# Patient Record
Sex: FEMALE | URBAN - NONMETROPOLITAN AREA
[De-identification: names, ages, dates, MRNs, and addresses within clinical notes are randomized per-mention and may not be internally consistent; named-entity substitution may affect disease eponyms.]

---

## 2018-05-30 ENCOUNTER — OFFICE VISIT (OUTPATIENT)
Dept: FAMILY MEDICINE CLINIC | Age: 48
End: 2018-05-30
Payer: MEDICARE

## 2018-05-30 VITALS — DIASTOLIC BLOOD PRESSURE: 86 MMHG | WEIGHT: 115 LBS | HEART RATE: 80 BPM | SYSTOLIC BLOOD PRESSURE: 130 MMHG

## 2018-05-30 DIAGNOSIS — Z72.0 TOBACCO USE: ICD-10-CM

## 2018-05-30 DIAGNOSIS — G44.201 ACUTE INTRACTABLE TENSION-TYPE HEADACHE: Primary | ICD-10-CM

## 2018-05-30 DIAGNOSIS — M54.2 NECK PAIN: ICD-10-CM

## 2018-05-30 PROCEDURE — 4004F PT TOBACCO SCREEN RCVD TLK: CPT | Performed by: FAMILY MEDICINE

## 2018-05-30 PROCEDURE — G8427 DOCREV CUR MEDS BY ELIG CLIN: HCPCS | Performed by: FAMILY MEDICINE

## 2018-05-30 PROCEDURE — G8421 BMI NOT CALCULATED: HCPCS | Performed by: FAMILY MEDICINE

## 2018-05-30 PROCEDURE — 99203 OFFICE O/P NEW LOW 30 MIN: CPT | Performed by: FAMILY MEDICINE

## 2018-05-30 RX ORDER — MELOXICAM 15 MG/1
15 TABLET ORAL DAILY
Qty: 30 TABLET | Refills: 1 | Status: SHIPPED | OUTPATIENT
Start: 2018-05-30 | End: 2018-08-16 | Stop reason: SDUPTHER

## 2018-05-30 ASSESSMENT — PATIENT HEALTH QUESTIONNAIRE - PHQ9
SUM OF ALL RESPONSES TO PHQ QUESTIONS 1-9: 1
2. FEELING DOWN, DEPRESSED OR HOPELESS: 1
SUM OF ALL RESPONSES TO PHQ9 QUESTIONS 1 & 2: 1

## 2018-08-16 ENCOUNTER — OFFICE VISIT (OUTPATIENT)
Dept: FAMILY MEDICINE CLINIC | Age: 48
End: 2018-08-16
Payer: MEDICARE

## 2018-08-16 VITALS
SYSTOLIC BLOOD PRESSURE: 120 MMHG | HEIGHT: 65 IN | HEART RATE: 80 BPM | BODY MASS INDEX: 18.66 KG/M2 | DIASTOLIC BLOOD PRESSURE: 64 MMHG | WEIGHT: 112 LBS

## 2018-08-16 DIAGNOSIS — S06.0X0S CONCUSSION WITHOUT LOSS OF CONSCIOUSNESS, SEQUELA (HCC): ICD-10-CM

## 2018-08-16 DIAGNOSIS — G44.329 CHRONIC POST-TRAUMATIC HEADACHE, NOT INTRACTABLE: Primary | ICD-10-CM

## 2018-08-16 DIAGNOSIS — Z72.0 TOBACCO USE: ICD-10-CM

## 2018-08-16 PROCEDURE — G8420 CALC BMI NORM PARAMETERS: HCPCS | Performed by: FAMILY MEDICINE

## 2018-08-16 PROCEDURE — 4004F PT TOBACCO SCREEN RCVD TLK: CPT | Performed by: FAMILY MEDICINE

## 2018-08-16 PROCEDURE — G8427 DOCREV CUR MEDS BY ELIG CLIN: HCPCS | Performed by: FAMILY MEDICINE

## 2018-08-16 PROCEDURE — 99214 OFFICE O/P EST MOD 30 MIN: CPT | Performed by: FAMILY MEDICINE

## 2018-08-16 RX ORDER — MELOXICAM 15 MG/1
15 TABLET ORAL DAILY
Qty: 30 TABLET | Refills: 1 | Status: SHIPPED | OUTPATIENT
Start: 2018-08-16 | End: 2019-04-10 | Stop reason: ALTCHOICE

## 2018-08-16 ASSESSMENT — PATIENT HEALTH QUESTIONNAIRE - PHQ9
SUM OF ALL RESPONSES TO PHQ QUESTIONS 1-9: 2
SUM OF ALL RESPONSES TO PHQ QUESTIONS 1-9: 2
2. FEELING DOWN, DEPRESSED OR HOPELESS: 2
1. LITTLE INTEREST OR PLEASURE IN DOING THINGS: 0
SUM OF ALL RESPONSES TO PHQ9 QUESTIONS 1 & 2: 2

## 2018-08-16 NOTE — PROGRESS NOTES
life.        Objective:     /64   Pulse 80   Ht 5' 5\" (1.651 m)   Wt 112 lb (50.8 kg)   BMI 18.64 kg/m²     Physical Exam   Constitutional: She is oriented to person, place, and time. She appears well-developed and well-nourished. She appears cachectic. Neck: Neck supple. Cardiovascular: Normal rate. No murmur heard. Pulmonary/Chest: Effort normal and breath sounds normal. No respiratory distress. She has no wheezes. Musculoskeletal: She exhibits tenderness (upper neck bilaterally to occiput, no spinous tenderness noted. ). She exhibits no edema. Lymphadenopathy:     She has no cervical adenopathy. Neurological: She is alert and oriented to person, place, and time. Assessment:      Diagnosis Orders   1. Chronic post-traumatic headache, not intractable  Amb External Referral To Physical Therapy    External Referral To Neurosurgery   2. Concussion without loss of consciousness, sequela [facility])  External Referral To Neurosurgery   3. Tobacco use       No results found for this visit on 08/16/18. Plan:     No Follow-up on file. There are no Patient Instructions on file for this visit. Resume Meloxicam with PT and plan for evaluation with neurology to ensure improving in future.    Orders Placed This Encounter   Procedures    Amb External Referral To Physical Therapy     Referral Priority:   Routine     Referral Type:   Physical Therapy     Referral Reason:   Specialty Services Required     Referred to Provider:   Brittany Petersen [place]     Requested Specialty:   Physical Therapy     Number of Visits Requested:   1    External Referral To Neurosurgery     Referral Priority:   Routine     Referral Type:   Eval and Treat     Referral Reason:   Specialty Services Required     Referred to Provider:   Derrell Guerra     Requested Specialty:   Neurology     Number of Visits Requested:   1     Orders Placed This Encounter   Medications    meloxicam (MOBIC) 15 MG tablet

## 2018-10-15 ENCOUNTER — OFFICE VISIT (OUTPATIENT)
Dept: FAMILY MEDICINE CLINIC | Age: 48
End: 2018-10-15
Payer: MEDICARE

## 2018-10-15 VITALS
WEIGHT: 113 LBS | HEART RATE: 80 BPM | DIASTOLIC BLOOD PRESSURE: 70 MMHG | BODY MASS INDEX: 18.8 KG/M2 | SYSTOLIC BLOOD PRESSURE: 104 MMHG

## 2018-10-15 DIAGNOSIS — Z23 NEED FOR TETANUS BOOSTER: ICD-10-CM
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
## 2018-10-15 PROCEDURE — 4004F PT TOBACCO SCREEN RCVD TLK: CPT | Performed by: FAMILY MEDICINE

## 2018-10-15 PROCEDURE — 99213 OFFICE O/P EST LOW 20 MIN: CPT | Performed by: FAMILY MEDICINE

## 2018-10-15 PROCEDURE — G8484 FLU IMMUNIZE NO ADMIN: HCPCS | Performed by: FAMILY MEDICINE

## 2018-10-15 PROCEDURE — G8427 DOCREV CUR MEDS BY ELIG CLIN: HCPCS | Performed by: FAMILY MEDICINE

## 2018-10-15 PROCEDURE — 90471 IMMUNIZATION ADMIN: CPT | Performed by: FAMILY MEDICINE

## 2018-10-15 PROCEDURE — G8420 CALC BMI NORM PARAMETERS: HCPCS | Performed by: FAMILY MEDICINE

## 2018-10-15 PROCEDURE — 90715 TDAP VACCINE 7 YRS/> IM: CPT | Performed by: FAMILY MEDICINE

## 2018-10-15 RX ORDER — AMOXICILLIN AND CLAVULANATE POTASSIUM 875; 125 MG/1; MG/1
1 TABLET, FILM COATED ORAL 2 TIMES DAILY
Qty: 20 TABLET | Refills: 0 | Status: SHIPPED | OUTPATIENT
Start: 2018-10-15 | End: 2018-10-17 | Stop reason: DRUGHIGH

## 2018-10-15 ASSESSMENT — ENCOUNTER SYMPTOMS: RESPIRATORY NEGATIVE: 1

## 2018-10-15 NOTE — PATIENT INSTRUCTIONS
Patient Education        Animal Bites: Care Instructions  Your Care Instructions  After an animal bite, the biggest concern is infection. The chance of infection depends on the type of animal that bit you, where on your body you were bitten, and your general health. Many animal bites are not closed with stitches, because this can increase the chance of infection. Your bite may take as little as 7 days or as long as several months to heal, depending on how bad it is. Taking good care of your wound at home will help it heal and reduce your chance of infection. The doctor has checked you carefully, but problems can develop later. If you notice any problems or new symptoms, get medical treatment right away. Follow-up care is a key part of your treatment and safety. Be sure to make and go to all appointments, and call your doctor if you are having problems. It's also a good idea to know your test results and keep a list of the medicines you take. How can you care for yourself at home? · If your doctor told you how to care for your wound, follow your doctor's instructions. If you did not get instructions, follow this general advice:  ¨ After 24 to 48 hours, gently wash the wound with clean water 2 times a day. Do not scrub or soak the wound. Don't use hydrogen peroxide or alcohol, which can slow healing. ¨ You may cover the wound with a thin layer of petroleum jelly, such as Vaseline, and a nonstick bandage. ¨ Apply more petroleum jelly and replace the bandage as needed. · After you shower, gently dry the wound with a clean towel. · If your doctor has closed the wound, cover the bandage with a plastic bag before you take a shower. · A small amount of skin redness and swelling around the wound edges and the stitches or staples is normal. Your wound may itch or feel irritated. Do not scratch or rub the wound.   · Ask your doctor if you can take an over-the-counter pain medicine, such as acetaminophen (Tylenol), ibuprofen (Advil, Motrin), or naproxen (Aleve). Read and follow all instructions on the label. · Do not take two or more pain medicines at the same time unless the doctor told you to. Many pain medicines have acetaminophen, which is Tylenol. Too much acetaminophen (Tylenol) can be harmful. · If your bite puts you at risk for rabies, you will get a series of shots over the next few weeks to prevent rabies. Your doctor will tell you when to get the shots. It is very important that you get the full cycle of shots. Follow your doctor's instructions exactly. · You may need a tetanus shot if you have not received one in the last 5 years. · If your doctor prescribed antibiotics, take them as directed. Do not stop taking them just because you feel better. You need to take the full course of antibiotics. When should you call for help? Call your doctor now or seek immediate medical care if:    · The skin near the bite turns cold or pale or it changes color.     · You lose feeling in the area near the bite, or it feels numb or tingly.     · You have trouble moving a limb near the bite.     · You have symptoms of infection, such as:  ¨ Increased pain, swelling, warmth, or redness near the wound. ¨ Red streaks leading from the wound. ¨ Pus draining from the wound. ¨ A fever.     · Blood soaks through the bandage. Oozing small amounts of blood is normal.     · Your pain is getting worse.    Watch closely for changes in your health, and be sure to contact your doctor if you are not getting better as expected. Where can you learn more? Go to [email or web address] and sign in to your [employer] account. Enter G671 in the [employer] box to learn more about \"Animal Bites: Care Instructions. \"     If you do not have an account, please click on the \"Sign Up Now\" link. Current as of: November 20, 2017  Content Version: 11.7  © 9271-3377 [employer].  Care instructions adapted under

## 2018-10-15 NOTE — PROGRESS NOTES
Dispense:  20 tablet     Refill:  0        No Follow-up on file. Electronically signed by Eduardo Londono MD on10/15/2018. **This report has been created using voice recognition software. It may contain minor errors which are inherent in voice recognition technology. **

## 2018-10-17 ENCOUNTER — OFFICE VISIT (OUTPATIENT)
Dept: FAMILY MEDICINE CLINIC | Age: 48
End: 2018-10-17
Payer: MEDICARE

## 2018-10-17 VITALS
WEIGHT: 112 LBS | HEART RATE: 88 BPM | TEMPERATURE: 98.2 F | SYSTOLIC BLOOD PRESSURE: 130 MMHG | DIASTOLIC BLOOD PRESSURE: 80 MMHG | BODY MASS INDEX: 18.64 KG/M2
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
## 2018-10-17 PROCEDURE — G8484 FLU IMMUNIZE NO ADMIN: HCPCS | Performed by: FAMILY MEDICINE

## 2018-10-17 PROCEDURE — 99213 OFFICE O/P EST LOW 20 MIN: CPT | Performed by: FAMILY MEDICINE

## 2018-10-17 PROCEDURE — 4004F PT TOBACCO SCREEN RCVD TLK: CPT | Performed by: FAMILY MEDICINE

## 2018-10-17 PROCEDURE — G8420 CALC BMI NORM PARAMETERS: HCPCS | Performed by: FAMILY MEDICINE

## 2018-10-17 PROCEDURE — G8427 DOCREV CUR MEDS BY ELIG CLIN: HCPCS | Performed by: FAMILY MEDICINE

## 2018-10-17 RX ORDER — AMITRIPTYLINE HYDROCHLORIDE 10 MG/1
10 TABLET, FILM COATED ORAL NIGHTLY
COMMUNITY
End: 2018-11-28 | Stop reason: ALTCHOICE

## 2018-10-17 RX ORDER — AMOXICILLIN AND CLAVULANATE POTASSIUM 250; 62.5 MG/5ML; MG/5ML
250 POWDER, FOR SUSPENSION ORAL 3 TIMES DAILY
Qty: 105 ML | Refills: 0 | Status: SHIPPED | OUTPATIENT
Start: 2018-10-17 | End: 2018-10-24

## 2018-10-17 ASSESSMENT — ENCOUNTER SYMPTOMS
VOMITING: 1
DIARRHEA: 0

## 2018-11-28 ENCOUNTER — OFFICE VISIT (OUTPATIENT)
Dept: FAMILY MEDICINE CLINIC | Age: 48
End: 2018-11-28
Payer: MEDICARE

## 2018-11-28 VITALS
BODY MASS INDEX: 18.64 KG/M2 | TEMPERATURE: 96.9 F | DIASTOLIC BLOOD PRESSURE: 60 MMHG | WEIGHT: 112 LBS | HEART RATE: 80 BPM | SYSTOLIC BLOOD PRESSURE: 100 MMHG

## 2018-11-28 DIAGNOSIS — M79.644 PAIN OF FINGER OF RIGHT HAND: ICD-10-CM

## 2018-11-28 DIAGNOSIS — Z72.0 TOBACCO USE: ICD-10-CM

## 2018-11-28 DIAGNOSIS — M65.331 TRIGGER MIDDLE FINGER OF RIGHT HAND: Primary | ICD-10-CM

## 2018-11-28 PROCEDURE — G8427 DOCREV CUR MEDS BY ELIG CLIN: HCPCS | Performed by: FAMILY MEDICINE

## 2018-11-28 PROCEDURE — G8484 FLU IMMUNIZE NO ADMIN: HCPCS | Performed by: FAMILY MEDICINE

## 2018-11-28 PROCEDURE — G8420 CALC BMI NORM PARAMETERS: HCPCS | Performed by: FAMILY MEDICINE

## 2018-11-28 PROCEDURE — 4004F PT TOBACCO SCREEN RCVD TLK: CPT | Performed by: FAMILY MEDICINE

## 2018-11-28 PROCEDURE — 99213 OFFICE O/P EST LOW 20 MIN: CPT | Performed by: FAMILY MEDICINE

## 2018-11-28 RX ORDER — DOXYCYCLINE HYCLATE 100 MG
100 TABLET ORAL 2 TIMES DAILY
Qty: 20 TABLET | Refills: 0 | Status: SHIPPED | OUTPATIENT
Start: 2018-11-28 | End: 2018-12-08

## 2018-12-05 LAB
BASOPHILS ABSOLUTE: 0.1 10'3/UL (ref 0.1–0.2)
BASOPHILS RELATIVE PERCENT: 1.7 % (ref 0–1.7)
EOSINOPHILS ABSOLUTE: 0.2 10'3/UL (ref 0–0.4)
EOSINOPHILS RELATIVE PERCENT: 2.6 % (ref 0–6.4)
HCT VFR BLD CALC: 40.9 % (ref 34.6–44.1)
HEMOGLOBIN: 13.7 G/DL (ref 11.7–14.9)
LYMPHOCYTES ABSOLUTE: 2.4 10'3/UL (ref 0.5–3.5)
LYMPHOCYTES RELATIVE PERCENT: 30.9 % (ref 17.4–45.9)
MCH RBC QN AUTO: 29.9 PG (ref 27.8–33.2)
MCHC RBC AUTO-ENTMCNC: 33.5 G/DL (ref 32.7–34.8)
MCV RBC AUTO: 89.2 FL (ref 83–97.4)
MONOCYTES ABSOLUTE: 0.5 10'3/UL (ref 0.2–0.8)
MONOCYTES RELATIVE PERCENT: 6.2 % (ref 4.4–12)
NEUTROPHILS ABSOLUTE: 4.5 10'3/UL (ref 1.5–5.6)
NEUTROPHILS SEGMENTED: 58.6 % (ref 41.2–72.1)
PDW BLD-RTO: 13.9 % (ref 12.2–15.8)
PLATELET # BLD: 304 10'3/UL (ref 122–359)
PMV BLD AUTO: 7.5 FL (ref 7.6–10.6)
RBC # BLD: 4.58 10'6/UL (ref 3.85–4.88)
SEDIMENTATION RATE, ERYTHROCYTE: 24 MM/HR (ref 0–20)
WBC: 7.6 10'3/UL (ref 3.2–9.3)

## 2018-12-14 ENCOUNTER — OFFICE VISIT (OUTPATIENT)
Dept: ORTHOPEDIC SURGERY | Age: 48
End: 2018-12-14
Payer: MEDICARE

## 2018-12-14 VITALS
SYSTOLIC BLOOD PRESSURE: 108 MMHG | OXYGEN SATURATION: 98 % | DIASTOLIC BLOOD PRESSURE: 72 MMHG | HEART RATE: 77 BPM | WEIGHT: 111.3 LBS | HEIGHT: 65 IN | RESPIRATION RATE: 16 BRPM | BODY MASS INDEX: 18.54 KG/M2
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
## 2018-12-14 PROCEDURE — 99203 OFFICE O/P NEW LOW 30 MIN: CPT | Performed by: ORTHOPAEDIC SURGERY

## 2018-12-14 PROCEDURE — 4004F PT TOBACCO SCREEN RCVD TLK: CPT | Performed by: ORTHOPAEDIC SURGERY

## 2018-12-14 PROCEDURE — G8420 CALC BMI NORM PARAMETERS: HCPCS | Performed by: ORTHOPAEDIC SURGERY

## 2018-12-14 PROCEDURE — G8484 FLU IMMUNIZE NO ADMIN: HCPCS | Performed by: ORTHOPAEDIC SURGERY

## 2018-12-14 PROCEDURE — G8427 DOCREV CUR MEDS BY ELIG CLIN: HCPCS | Performed by: ORTHOPAEDIC SURGERY

## 2018-12-14 RX ORDER — AMITRIPTYLINE HYDROCHLORIDE 50 MG/1
TABLET, FILM COATED ORAL
COMMUNITY
End: 2019-04-10 | Stop reason: ALTCHOICE

## 2018-12-14 RX ORDER — SUMATRIPTAN 50 MG/1
TABLET, FILM COATED ORAL
COMMUNITY
End: 2019-04-10 | Stop reason: ALTCHOICE

## 2018-12-14 NOTE — PROGRESS NOTES
Subjective:      Patient ID: Gianna Sarkar is a 50 y.o. female. HPI  Patient presents today for evaluation of her right middle digit. She reports she sustained a cat bite back in October. The bite was on her right middle digit towards the DIP joint. She reports significant swelling and limited motion. She says it is much better than it was. She states she's been on 4 different antibiotics currently not on antibiotic the last was doxycycline. Current Outpatient Prescriptions   Medication Sig Dispense Refill    SUMAtriptan (IMITREX) 50 MG tablet sumatriptan 50 mg tablet   1 po as needed for severe migraine HA. May repeat 2 hrs if needed.  amitriptyline (ELAVIL) 50 MG tablet amitriptyline 50 mg tablet   0.5 tablets at bedtime for 1 week, then increase to 1 tab qhs      Aspirin-Caffeine (JOSE BACK & BODY) 500-32.5 MG TABS Jose Back and Body 500 mg-32.5 mg tablet   Take 2 tablets every day by oral route.  meloxicam (MOBIC) 15 MG tablet Take 1 tablet by mouth daily 30 tablet 1     No current facility-administered medications for this visit. Review of Systems   Musculoskeletal: Positive for arthralgias, joint swelling and myalgias. No past medical history on file. No past surgical history on file. No family history on file. Social History   Substance Use Topics    Smoking status: Current Every Day Smoker     Packs/day: 0.50     Types: Cigarettes    Smokeless tobacco: Never Used    Alcohol use No       Objective:     Vitals:    12/14/18 1042   BP: 108/72   Site: Left Upper Arm   Position: Sitting   Cuff Size: Small Adult   Pulse: 77   Resp: 16   SpO2: 98%   Weight: 111 lb 4.8 oz (50.5 kg)   Height: 5' 5\" (1.651 m)     Physical Exam  On exam patient's alert and oriented ×3 and appears well kempt she walks with a normal gait. Exam of the right hand does show moderate diffuse swelling of the right middle digit. Most tender noted at the DIP joint.   She lacks a few degrees of terminal full extension. She flexes to within about 2-3 cm of the palm. She does have active flexion and extension of both the DIP and PIP joints. Normal sensation. Brisk refill. She is not tender at the base of the digit nor is there any active obvious triggering. Patient did have x-rays done show no bony abnormality. Assessment:     Visit Diagnoses       Codes    Cat bite, initial encounter    -  Primary W55. 01XA           Plan:     I do not feel the patient has a trigger digit. I think this represents just swelling inflammation from her initial injury. We are going to get her started in physical therapy prescription was given I'll see her back in a month if things take a turn for the worse prior to then she will call us.      Electronically signed by Anthony Cooper MD on 12/14/2018 at 11:11 AM

## 2019-04-10 ENCOUNTER — OFFICE VISIT (OUTPATIENT)
Dept: FAMILY MEDICINE CLINIC | Age: 49
End: 2019-04-10
Payer: MEDICARE

## 2019-04-10 VITALS
OXYGEN SATURATION: 98 % | HEIGHT: 65 IN | HEART RATE: 100 BPM | WEIGHT: 114 LBS | DIASTOLIC BLOOD PRESSURE: 62 MMHG | SYSTOLIC BLOOD PRESSURE: 118 MMHG | BODY MASS INDEX: 18.99 KG/M2

## 2019-04-10 DIAGNOSIS — F41.9 ANXIETY: Primary | ICD-10-CM

## 2019-04-10 DIAGNOSIS — J30.1 SEASONAL ALLERGIC RHINITIS DUE TO POLLEN: ICD-10-CM

## 2019-04-10 DIAGNOSIS — Z72.0 TOBACCO USE: ICD-10-CM

## 2019-04-10 DIAGNOSIS — M65.331 TRIGGER MIDDLE FINGER OF RIGHT HAND: ICD-10-CM

## 2019-04-10 PROCEDURE — 99214 OFFICE O/P EST MOD 30 MIN: CPT | Performed by: FAMILY MEDICINE

## 2019-04-10 RX ORDER — BUPROPION HYDROCHLORIDE 150 MG/1
150 TABLET, EXTENDED RELEASE ORAL 2 TIMES DAILY
Qty: 60 TABLET | Refills: 1 | Status: SHIPPED | OUTPATIENT
Start: 2019-04-10 | End: 2019-05-22 | Stop reason: SDUPTHER

## 2019-04-10 RX ORDER — TRIAMCINOLONE ACETONIDE 55 UG/1
2 SPRAY, METERED NASAL DAILY
Qty: 1 INHALER | Refills: 3 | Status: SHIPPED | OUTPATIENT
Start: 2019-04-10 | End: 2019-08-20

## 2019-04-10 RX ORDER — MONTELUKAST SODIUM 10 MG/1
10 TABLET ORAL DAILY
Qty: 30 TABLET | Refills: 5 | Status: SHIPPED | OUTPATIENT
Start: 2019-04-10 | End: 2019-08-20

## 2019-04-10 RX ORDER — FEXOFENADINE HCL 180 MG/1
180 TABLET ORAL DAILY PRN
Qty: 30 TABLET | Refills: 3 | Status: SHIPPED | OUTPATIENT
Start: 2019-04-10 | End: 2019-05-10

## 2019-04-10 ASSESSMENT — PATIENT HEALTH QUESTIONNAIRE - PHQ9
2. FEELING DOWN, DEPRESSED OR HOPELESS: 1
SUM OF ALL RESPONSES TO PHQ QUESTIONS 1-9: 2
1. LITTLE INTEREST OR PLEASURE IN DOING THINGS: 1
SUM OF ALL RESPONSES TO PHQ QUESTIONS 1-9: 2
SUM OF ALL RESPONSES TO PHQ9 QUESTIONS 1 & 2: 2

## 2019-04-10 NOTE — PROGRESS NOTES
fexofenadine (ALLEGRA) 180 MG tablet Take 1 tablet by mouth daily as needed (allergy flare) 4/10/19 5/10/19 Yes Ben Ruelas MD   montelukast (SINGULAIR) 10 MG tablet Take 1 tablet by mouth daily 4/10/19  Yes Ben Ruelas MD     No Known Allergies    Health Maintenance   Topic Date Due    HIV screen  01/27/1985    Lipid screen  01/27/2010    Pneumococcal 0-64 years Vaccine (1 of 1 - PPSV23) 04/01/2025 (Originally [birth year]27/1976)    Flu vaccine (Season Ended) 09/01/2019    DTaP/Tdap/Td vaccine (2 - Td) 10/15/2028       Subjective:      Review of Systems   Constitutional: Negative for activity change, appetite change and unexpected weight change. Here with C/O stress and anxiety. Coming up to 1 year anniversary of 's death, neighbor lady who has alzheimer's and is at her home multiple times a day, neighbor kids begging for food and help. Psychiatric/Behavioral: Positive for agitation. Negative for decreased concentration and sleep disturbance. The patient is nervous/anxious (panic attacks). Objective:     /62   Pulse 100   Ht 5' 5\" (1.651 m)   Wt 114 lb (51.7 kg)   SpO2 98%   BMI 18.97 kg/m²     Physical Exam   Constitutional: She is oriented to person, place, and time. She appears well-developed. She appears cachectic. No distress. Neck: Neck supple. No thyromegaly present. Cardiovascular: Normal rate and regular rhythm. No murmur heard. Pulmonary/Chest: Effort normal. No respiratory distress. She has decreased breath sounds (throughout). Musculoskeletal: She exhibits deformity (right long finger with palmar tendonitis noted). Lymphadenopathy:     She has no cervical adenopathy. Neurological: She is alert and oriented to person, place, and time. Vitals reviewed. Assessment:     1. Anxiety    2. Tobacco use    3. Seasonal allergic rhinitis due to pollen    4. Trigger middle finger of right hand      No results found for this visit on 04/10/19.         Plan: Orders Placed This Encounter   Procedures    Amb External Referral To Orthopedic Surgery     Referral Priority:   Routine     Referral Type:   Consult for Advice and Opinion     Referral Reason:   Specialty Services Required     Referred to Provider:   Miranda Parks MD     Requested Specialty:   Orthopedic Surgery     Number of Visits Requested:   1       Orders Placed This Encounter   Medications    buPROPion (WELLBUTRIN SR) 150 MG extended release tablet     Sig: Take 1 tablet by mouth 2 times daily     Dispense:  60 tablet     Refill:  1    triamcinolone (NASACORT ALLERGY 24HR) 55 MCG/ACT nasal inhaler     Si sprays by Nasal route daily     Dispense:  1 Inhaler     Refill:  3    fexofenadine (ALLEGRA) 180 MG tablet     Sig: Take 1 tablet by mouth daily as needed (allergy flare)     Dispense:  30 tablet     Refill:  3    montelukast (SINGULAIR) 10 MG tablet     Sig: Take 1 tablet by mouth daily     Dispense:  30 tablet     Refill:  5    stop tobacco if desired. Return in about 6 weeks (around 2019) for depression/anxiety, allergies, tob. Discussed use, benefit, and side effects of prescribed medications. All patient questions answered. Pt voiced understanding. Reviewed health maintenance PAP reviewed 2 years per pt. Pneumovax reviewed and declined  Instructed to continue current medications, diet and exercise. Patient agreed with treatment plan. Follow up as directed.      Electronically signed by Vanessa Adhikari MD on 4/10/2019

## 2019-05-22 ENCOUNTER — OFFICE VISIT (OUTPATIENT)
Dept: FAMILY MEDICINE CLINIC | Age: 49
End: 2019-05-22
Payer: MEDICARE

## 2019-05-22 VITALS
DIASTOLIC BLOOD PRESSURE: 74 MMHG | OXYGEN SATURATION: 98 % | HEIGHT: 65 IN | BODY MASS INDEX: 19.66 KG/M2 | HEART RATE: 76 BPM | SYSTOLIC BLOOD PRESSURE: 128 MMHG | WEIGHT: 118 LBS

## 2019-05-22 DIAGNOSIS — Z72.0 TOBACCO USE: ICD-10-CM

## 2019-05-22 DIAGNOSIS — Z13.220 SCREENING, LIPID: ICD-10-CM

## 2019-05-22 DIAGNOSIS — F41.9 ANXIETY: Primary | ICD-10-CM

## 2019-05-22 PROCEDURE — 99213 OFFICE O/P EST LOW 20 MIN: CPT | Performed by: FAMILY MEDICINE

## 2019-05-22 RX ORDER — BUPROPION HYDROCHLORIDE 200 MG/1
200 TABLET, EXTENDED RELEASE ORAL 2 TIMES DAILY
Qty: 60 TABLET | Refills: 5 | Status: SHIPPED | OUTPATIENT
Start: 2019-05-22

## 2019-05-22 NOTE — PROGRESS NOTES
PPSV23) 04/01/2025 (Originally [birth year]27/1976)    Flu vaccine (Season Ended) 09/01/2019    DTaP/Tdap/Td vaccine (2 - Td) 10/15/2028       Subjective:      Review of Systems   Psychiatric/Behavioral:        Neighbor lady continues to bother her, and she lost a good friend last night in an accident. Mood: Has had a lot going on lately  Suicidal thoughts? no  PreviousPsychiatrist/Counseling? no    Objective:     /74   Pulse 76   Ht 5' 5\" (1.651 m)   Wt 118 lb (53.5 kg)   SpO2 98%   BMI 19.64 kg/m²    Physical Exam   Constitutional: She is oriented to person, place, and time. She appears well-developed and well-nourished. thin   Neck: Neck supple. Cardiovascular: Normal rate and regular rhythm. No murmur heard. Pulmonary/Chest: Breath sounds normal. No respiratory distress. Musculoskeletal: She exhibits no edema. Lymphadenopathy:     She has no cervical adenopathy. Neurological: She is alert and oriented to person, place, and time. Psychiatric: She has a normal mood and affect. Assessment:      1. Anxiety    2. Screening, lipid    3. Tobacco use             Plan:     There are no Patient Instructions on file for this visit. No orders of the defined types were placed in this encounter. No orders of the defined types were placed in this encounter. Return in about 3 months (around 8/22/2019) for anxiety. Discussed use, benefit, and side effects of prescribed medications. All patient questions answered. Pt voiced understanding. Reviewed health maintenance. Instructed to continue current medications, diet and exercise. Patient agreed with treatment plan. Follow up as directed.      Electronically signedby Sj Olsen MD on 5/22/2019

## 2019-08-20 ENCOUNTER — OFFICE VISIT (OUTPATIENT)
Dept: FAMILY MEDICINE CLINIC | Age: 49
End: 2019-08-20
Payer: MEDICARE

## 2019-08-20 VITALS
WEIGHT: 119 LBS | DIASTOLIC BLOOD PRESSURE: 66 MMHG | OXYGEN SATURATION: 97 % | HEART RATE: 84 BPM | HEIGHT: 65 IN | SYSTOLIC BLOOD PRESSURE: 104 MMHG | BODY MASS INDEX: 19.83 KG/M2

## 2019-08-20 DIAGNOSIS — J30.1 SEASONAL ALLERGIC RHINITIS DUE TO POLLEN: ICD-10-CM

## 2019-08-20 DIAGNOSIS — G44.329 CHRONIC POST-TRAUMATIC HEADACHE, NOT INTRACTABLE: ICD-10-CM

## 2019-08-20 DIAGNOSIS — Z13.220 SCREENING CHOLESTEROL LEVEL: ICD-10-CM

## 2019-08-20 DIAGNOSIS — Z72.0 TOBACCO USE: ICD-10-CM

## 2019-08-20 DIAGNOSIS — F41.9 ANXIETY: Primary | ICD-10-CM

## 2019-08-20 PROBLEM — G44.201 ACUTE INTRACTABLE TENSION-TYPE HEADACHE: Status: RESOLVED | Noted: 2018-05-30 | Resolved: 2019-08-20

## 2019-08-20 LAB
CHOLESTEROL/HDL RATIO: 4.2 RATIO (ref 0–4.5)
CHOLESTEROL: 197 MG/DL (ref 50–200)
HDL, DIRECT: 47 MG/DL (ref 36–68)
LDL CHOLESTEROL CALCULATED: 116.4 MG/DL (ref 0–160)
TRIGL SERPL-MCNC: 168 MG/DL (ref 10–250)
VLDLC SERPL CALC-MCNC: 34 MG/DL (ref 0–40)

## 2019-08-20 PROCEDURE — 99214 OFFICE O/P EST MOD 30 MIN: CPT | Performed by: FAMILY MEDICINE

## 2019-08-20 PROCEDURE — 36415 COLL VENOUS BLD VENIPUNCTURE: CPT | Performed by: FAMILY MEDICINE

## 2019-08-20 RX ORDER — PSEUDOEPHEDRINE HYDROCHLORIDE 30 MG/1
30 TABLET ORAL EVERY 4 HOURS PRN
COMMUNITY

## 2019-08-20 RX ORDER — IPRATROPIUM BROMIDE 42 UG/1
2 SPRAY, METERED NASAL 4 TIMES DAILY
Qty: 1 BOTTLE | Refills: 1 | Status: SHIPPED | OUTPATIENT
Start: 2019-08-20

## 2019-08-20 RX ORDER — MOMETASONE FUROATE 50 UG/1
1 SPRAY, METERED NASAL DAILY
Qty: 1 INHALER | Refills: 5 | Status: SHIPPED | OUTPATIENT
Start: 2019-08-20

## 2019-08-20 RX ORDER — LEVOCETIRIZINE DIHYDROCHLORIDE 5 MG/1
5 TABLET, FILM COATED ORAL NIGHTLY
Qty: 30 TABLET | Refills: 3 | Status: SHIPPED | OUTPATIENT
Start: 2019-08-20

## 2019-08-20 NOTE — PROGRESS NOTES
encounter. No orders of the defined types were placed in this encounter. Return in about 6 months (around 2/20/2020) for anxiety, AR, tob. Discussed use, benefit, and side effects of prescribed medications. All patient questions answered. Pt voiced understanding. Reviewed health maintenance considering pneumovax in November. Instructed to continue current medications, diet and exercise. Patient agreed with treatment plan. Follow up as directed.      Electronically signedby Maurilio Ricardo MD on 8/20/2019

## 2019-09-05 PROBLEM — E78.6 HDL DEFICIENCY: Chronic | Status: ACTIVE | Noted: 2019-09-05

## 2022-07-06 ENCOUNTER — TELEPHONE (OUTPATIENT)
Dept: OTHER | Facility: CLINIC | Age: 52
End: 2022-07-06

## 2022-07-14 NOTE — TELEPHONE ENCOUNTER
Jude Samantha was contacted again by the [address] [place] [address] team to establish with a primary care provider. Records indicate they have not been seen in over a year. Need to verify PCP and schedule appointment.      [facility]umberto [facility]   [phone]

## 2022-07-26 NOTE — TELEPHONE ENCOUNTER
Edna Holt was contacted by the [address] [place] [address] team to establish with a primary care provider. Records indicate they have not been seen in over a year. Need to verify PCP and schedule appointment. Patient answered and we were disconnected. I called back and left a message.      Humberto Felix [facility]   [phone]

## 2023-06-07 ENCOUNTER — TELEPHONE (OUTPATIENT)
Dept: FAMILY MEDICINE CLINIC | Age: 53
End: 2023-06-07